# Patient Record
Sex: FEMALE | URBAN - METROPOLITAN AREA
[De-identification: names, ages, dates, MRNs, and addresses within clinical notes are randomized per-mention and may not be internally consistent; named-entity substitution may affect disease eponyms.]

---

## 2021-04-23 ENCOUNTER — OFFICE VISIT (OUTPATIENT)
Dept: URBAN - METROPOLITAN AREA CLINIC 88 | Facility: CLINIC | Age: 56
End: 2021-04-23
Payer: COMMERCIAL

## 2021-04-23 DIAGNOSIS — H52.13 MYOPIA, BILATERAL: Chronic | ICD-10-CM

## 2021-04-23 DIAGNOSIS — H04.123 DRY EYE SYNDROME OF BILATERAL LACRIMAL GLANDS: Chronic | ICD-10-CM

## 2021-04-23 PROCEDURE — 99203 OFFICE O/P NEW LOW 30 MIN: CPT | Performed by: OPTOMETRIST

## 2021-04-23 RX ORDER — PREDNISOLONE ACETATE 10 MG/ML
1 % SUSPENSION/ DROPS OPHTHALMIC
Qty: 5 | Refills: 0 | Status: INACTIVE
Start: 2021-04-23 | End: 2021-05-13

## 2021-04-23 ASSESSMENT — KERATOMETRY
OD: 42.38
OS: 42.00

## 2021-04-23 ASSESSMENT — INTRAOCULAR PRESSURE
OS: 20
OD: 21

## 2021-04-23 ASSESSMENT — VISUAL ACUITY
OS: 20/20
OD: 20/25

## 2021-04-23 NOTE — IMPRESSION/PLAN
Impression: Myopia, bilateral: H52.13. Plan: Patient educated that glasses rx would improve acuity. Will prescribe when dry eye improved.

## 2021-04-23 NOTE — IMPRESSION/PLAN
Impression: Episcleritis of right eye: H15.101. Plan: Discussed status in detail. Will start Pred 1% QID OD x 1 week then BID OD x 1 week. (potential side effects discussed). RTC in 2 weeks for follow up. Reviewed risk associated with not dilating pupils. Patient understands risk and will perform in 2 weeks.  Patient knows to RTC immediately if flashes, floaters or changes in vision are experienced

## 2021-05-13 ENCOUNTER — OFFICE VISIT (OUTPATIENT)
Dept: URBAN - METROPOLITAN AREA CLINIC 88 | Facility: CLINIC | Age: 56
End: 2021-05-13
Payer: COMMERCIAL

## 2021-05-13 DIAGNOSIS — H15.101 EPISCLERITIS OF RIGHT EYE: Primary | ICD-10-CM

## 2021-05-13 DIAGNOSIS — H43.393 OTHER VITREOUS OPACITIES, BILATERAL: ICD-10-CM

## 2021-05-13 PROCEDURE — 99213 OFFICE O/P EST LOW 20 MIN: CPT | Performed by: OPTOMETRIST

## 2021-05-13 RX ORDER — PREDNISOLONE ACETATE 10 MG/ML
1 % SUSPENSION/ DROPS OPHTHALMIC
Qty: 10 | Refills: 1 | Status: ACTIVE
Start: 2021-05-13

## 2021-05-13 ASSESSMENT — INTRAOCULAR PRESSURE
OD: 18
OS: 17

## 2021-06-10 ENCOUNTER — OFFICE VISIT (OUTPATIENT)
Dept: URBAN - METROPOLITAN AREA CLINIC 88 | Facility: CLINIC | Age: 56
End: 2021-06-10
Payer: COMMERCIAL

## 2021-06-10 PROCEDURE — 99213 OFFICE O/P EST LOW 20 MIN: CPT | Performed by: OPTOMETRIST

## 2021-06-10 ASSESSMENT — INTRAOCULAR PRESSURE
OS: 16
OD: 16

## 2021-06-10 NOTE — IMPRESSION/PLAN
Impression: Episcleritis of right eye: H15.101. Plan: Discussed status in detail. Patient to taper Prednisolone 1% every 3 days for 3 weeks then D/C. RTC if any new symptoms or problems experienced. RTC in 9 months.

## 2022-05-06 ENCOUNTER — OFFICE VISIT (OUTPATIENT)
Dept: URBAN - METROPOLITAN AREA CLINIC 88 | Facility: CLINIC | Age: 57
End: 2022-05-06
Payer: COMMERCIAL

## 2022-05-06 DIAGNOSIS — H43.393 OTHER VITREOUS OPACITIES, BILATERAL: ICD-10-CM

## 2022-05-06 DIAGNOSIS — H04.123 DRY EYE SYNDROME OF BILATERAL LACRIMAL GLANDS: ICD-10-CM

## 2022-05-06 DIAGNOSIS — E11.9 TYPE 2 DIABETES MELLITUS W/O COMPLICATION: Primary | ICD-10-CM

## 2022-05-06 DIAGNOSIS — H25.13 AGE-RELATED NUCLEAR CATARACT, BILATERAL: ICD-10-CM

## 2022-05-06 PROCEDURE — 99213 OFFICE O/P EST LOW 20 MIN: CPT | Performed by: OPTOMETRIST

## 2022-05-06 ASSESSMENT — INTRAOCULAR PRESSURE
OS: 16
OD: 16

## 2022-05-06 NOTE — IMPRESSION/PLAN
Impression: Type 2 diabetes mellitus w/o complication: D50.6. Plan: Reviewed with patient that no retinal vascular changes are occurring from diabetes. Patient to continue care with current medical provider for diabetes management. Importance of retinal exams reviewed. Will continue to observe with dilated examination in 12 months.

## 2022-05-06 NOTE — IMPRESSION/PLAN
Impression: Age-related nuclear cataract, bilateral: H25.13. Plan: Educated patient to mild status. Monitor. Patient to RTC if any new symptoms experienced. Refill Authorization Note   Marleen Alcocer  is requesting a refill authorization.  Brief Assessment and Rationale for Refill:  Approve     Medication Therapy Plan:       Medication Reconciliation Completed: No   Comments:   --->Care Gap information included below if applicable.   Orders Placed This Encounter    citalopram (CELEXA) 20 MG tablet      Requested Prescriptions   Signed Prescriptions Disp Refills    citalopram (CELEXA) 20 MG tablet 180 tablet 1     Sig: TAKE 2 TABLETS BY MOUTH ONCE DAILY       Psychiatry:  Antidepressants - SSRI Passed - 1/29/2022 12:12 AM        Passed - Patient is at least 18 years old        Passed - Valid encounter within last 15 months     Recent Visits  Date Type Provider Dept   08/16/21 [facility]/15/21 [facility]   01/14/21 [facility]   Showing recent visits within past 720 days and meeting all other requirements  Future Appointments  No visits were found meeting these conditions.  Showing future appointments within next 150 days and meeting all other requirements      Future Appointments              In 5 days [facility] [place]    In 1 week [facility]                    Appointments  past 12m or future 3m with PCP    Date Provider   Last Visit   8/16/2021 Jose Urbano Jr., MD   Next Visit   1/29/2022 Jose Urbaon Jr., MD   ED visits in past 90 days: 0     Note composed:12:14 PM 02/05/2022

## 2022-06-16 ENCOUNTER — TESTING ONLY (OUTPATIENT)
Dept: URBAN - METROPOLITAN AREA CLINIC 88 | Facility: CLINIC | Age: 57
End: 2022-06-16
Payer: COMMERCIAL

## 2022-06-16 DIAGNOSIS — H52.13 MYOPIA, BILATERAL: Primary | ICD-10-CM

## 2022-06-16 ASSESSMENT — VISUAL ACUITY
OD: 20/20
OS: 20/30